# Patient Record
Sex: FEMALE | Race: WHITE | NOT HISPANIC OR LATINO | URBAN - METROPOLITAN AREA
[De-identification: names, ages, dates, MRNs, and addresses within clinical notes are randomized per-mention and may not be internally consistent; named-entity substitution may affect disease eponyms.]

---

## 2017-11-21 ENCOUNTER — OFFICE VISIT - RIVER FALLS (OUTPATIENT)
Dept: FAMILY MEDICINE | Facility: CLINIC | Age: 47
End: 2017-11-21

## 2017-11-23 LAB
CREAT SERPL-MCNC: 0.73 MG/DL (ref 0.5–1.1)
GLUCOSE BLD-MCNC: 108 MG/DL (ref 65–99)
HBA1C MFR BLD: 5.7 %
LDLC SERPL CALC-MCNC: 138 MG/DL

## 2017-11-28 ENCOUNTER — OFFICE VISIT - RIVER FALLS (OUTPATIENT)
Dept: FAMILY MEDICINE | Facility: CLINIC | Age: 47
End: 2017-11-28

## 2017-11-28 ASSESSMENT — MIFFLIN-ST. JEOR: SCORE: 1753.02

## 2022-02-12 VITALS
HEIGHT: 64 IN | TEMPERATURE: 98.6 F | DIASTOLIC BLOOD PRESSURE: 94 MMHG | BODY MASS INDEX: 43.77 KG/M2 | SYSTOLIC BLOOD PRESSURE: 152 MMHG | DIASTOLIC BLOOD PRESSURE: 76 MMHG | WEIGHT: 256.4 LBS | TEMPERATURE: 98.8 F | SYSTOLIC BLOOD PRESSURE: 118 MMHG | HEART RATE: 94 BPM | WEIGHT: 254 LBS | HEART RATE: 108 BPM

## 2022-02-15 NOTE — PROGRESS NOTES
Patient:   JEN VACA            MRN: [id number]            FIN: [id number]               Age:   47 years     Sex:  Female     :  1970   Associated Diagnoses:   Hyperlipidemia; Hypertension; Prediabetes; Overweight   Author:   Anya Kerns MD      Visit Information      Date of Service: 2017 06:00 pm  Performing Location: [facility]  Encounter#: [id number]      Primary Care Provider (PCP):  Stanley Cadena MD, NPI# [id number]      Referring Provider:  Anya Kerns MD    NPI# [id number]      Chief Complaint   2017 6:21 PM CST   Patient is here for lab follow up.        History of Present Illness   pt here to follow up on her labs and reports there has been a lot of variance in her blood pressure when she checks it at home, she is not sure if it is accurate, she is concerned about her labs because her mom  from diabetes related complications, she has had success in weight loss in the past but has not been folowing this lifestyle lateley, she plans to get back into eating healthier and exercising more, her sister is a good support for this also..        Review of Systems             Health Status   Allergies:    Allergic Reactions (Selected)  Severity Not Documented  Adhesive tape (No reactions were documented)  Cipro (No reactions were documented)   Medications:  (Selected)   Prescriptions  Prescribed  Valtrex 1 g oral tablet: 1 tab(s) ( 1 gm ), po, bid, # 60 tab(s), 0 Refill(s), Type: Maintenance, Pharmacy: [facility] #[id number], 1 tab(s) po bid  cholestyramine 4 g/9 g oral powder for reconstitution: See Instructions, Instructions: MIX 1 SCOOP (4 GM) WITH BEVERAGE AND DRINK 3 TIMES DAILY WITH MEALS, # 378 gm, 10 Refill(s), Type: Soft Stop, Pharmacy: [facility] #[id number], MIX 1 SCOOP (4 GM) WITH BEVERAGE AND DRINK 3 TIMES DAILY WITH MEALS  miconazole 2% topical powder: 1 ester, TOP, BID, PRN: for rash, # 90 gm, 1 Refill(s), Type: Maintenance, Pharmacy: [facility] #[id number], 1 ester top bid,PRN:for rash  omeprazole 40 mg oral delayed release capsule: 1 cap(s) ( 40 mg ), PO, Daily, # 90 cap(s), 3 Refill(s), Type: Maintenance, Pharmacy: [facility] #[id number], 1 cap(s) po daily  ondansetron 4 mg oral tablet, disintegratin tab(s) ( 4 mg ), PO, q8 hrs, PRN: for nausea/vomiting, # 20 tab(s), 6 Refill(s), Type: Maintenance, Pharmacy: [facility] #[id number], 1 tab(s) po q8 hrs,PRN:for nausea/vomiting  Documented Medications  Documented  Multiple Vitamins oral tablet: 1 tab(s), po, daily, 0 Refill(s), Type: Maintenance  Probiotics: Probiotics, Instructions: One tab po daily, 0 Refill(s), Type: Maintenance  Vitamin C: 0 Refill(s)  Vitamin D: po, mL, 0 Refill(s)   Problem list:    All Problems  GERD (Gastroesophageal Reflux Disease) / ICD-9-.81 / Confirmed  Hyperlipidemia / SNOMED CT 57044410 / Confirmed  Hypertension / SNOMED CT 6521243993 / Confirmed  Lower Back Pain / ICD-9-.2 / Confirmed  Migraine Headache / ICD-9-.90 / Confirmed  Overweight / ICD-9-.02 / Confirmed  Prediabetes / SNOMED CT 6889122672 / Confirmed  Recurrent Cold Sores / ICD-9-.9 / Confirmed  Seasonal Allergies / ICD-9-.9 / Confirmed  Resolved: Menorrhagia / ICD-9-.2      Histories   Past Medical History:    Active  Migraine Headache (346.90)  Seasonal Allergies (477.9)  Lower Back Pain (724.2)  Recurrent Cold Sores (054.9)  Overweight (278.02)  GERD (Gastroesophageal Reflux Disease) (530.81)  Resolved  Menorrhagia (626.2):  Resolved.   Family History:    Diabetes mellitus  Mother ()  Grandfather (P)  Hypertension  Mother ()  Myocardial infarction  Grandmother (P)  Migraine  Mother ()  MI - Myocardial infarction  Grandmother (M)  Grandfather (P)  Seasonal allergy  Son     Procedure history:    Excisional biopsy of breast mass ([id number]) on 2014 at 44 Years.  Comments:  2014 7:54 AM - Fior Castle.  Esophagogastroduodenoscopy  ([id number]) in 2008 at 38 Years.  Cholecystectomy ([id number]) on 2004 at 34 Years.   section ([id number]).  Comments:  3/8/2013 12:54 PM - Maya Pedroza  x 3  Supracervical hysterectomy ([id number]).  Comments:  3/8/2013 12:55 PM - Maya Pedroza  for menorrhagia   Social History:        Alcohol Assessment: Current            Current      Tobacco Assessment            Never      Substance Abuse Assessment            Never      Employment and Education Assessment            Employed, Work/School description: Day care.      Home and Environment Assessment            Marital status: .  Spouse/Partner name: Second marriage to Trenton Boogie.      Exercise and Physical Activity Assessment            Exercise frequency: 1-2 times/week.  Exercise type: Walking, Treadmill.      Other Assessment            .                     Comments:                      2010 - Shaista Khalil                     Re-         Physical Examination   Vital Signs   2017 6:21 PM CST Temperature Tympanic 98.6 DegF    Peripheral Pulse Rate 108 bpm  HI    Pulse Site Radial artery    HR Method Manual    Systolic Blood Pressure 118 mmHg    Diastolic Blood Pressure 76 mmHg    Mean Arterial Pressure 90 mmHg    BP Site Right arm    BP Method Manual      Measurements from flowsheet : Measurements   2017 6:21 PM CST Height Measured - Standard 64 in    Weight Measured - Standard 256.4 lb    BSA 2.29 m2    Body Mass Index 44.01 kg/m2      General:  Alert and oriented, No acute distress.    Eye:  Pupils are equal, round and reactive to light, Extraocular movements are intact, Normal conjunctiva.    HENT:  Normocephalic, hearing grossly normal during our conversation.    Respiratory:  Respirations are non-labored, Symmetrical chest wall expansion.    Cardiovascular:  Normal peripheral perfusion, brisk capillary refill.    Musculoskeletal:  Normal gait.    Integumentary:  Warm, Dry, No rash.    Neurologic:  Alert,  Oriented, No focal deficits, Cranial Nerves II-XII are grossly intact.    Cognition and Speech:  Speech clear and coherent, Functional cognition intact.    Psychiatric:  Cooperative, Appropriate mood & affect, Normal judgment, Non-suicidal.       Health Maintenance      Recommendations     Pending (in the next year)        OverDue           Alcohol Misuse Screen (Female) due  12/10/14  and every 1  year(s)           Depression Screen (Female) due  12/10/14  and every 1  year(s)           Breast Cancer Screen due  11/20/15  and every 1  year(s)        Due            HIV Screen (if sexually active) (Female) due  11/28/17  and every 1  year(s)           STD Counseling (if sexually active) (Female) due  11/28/17  and every 1  year(s)           Syphilis Screen (if sexually active) (Female) due  11/28/17  and every 1  year(s)        Due In Future            Lipid Disorders Screen (Female) not due until  11/21/18  and every 1  year(s)           Type 2 Diabetes Mellitus Screen (Female) not due until  11/21/18  and every 1  year(s)     Satisfied (in the past 1 year)        Satisfied            Body Mass Index Check (Female) on  11/28/17.           High Blood Pressure Screen (Female) on  11/28/17.           High Blood Pressure Screen (Female) on  11/21/17.           Lipid Disorders Screen (Female) on  11/21/17.           Tobacco Use Screen (Female) on  11/28/17.           Tobacco Use Screen (Female) on  11/21/17.           Type 2 Diabetes Mellitus Screen (Female) on  11/21/17.          Review / Management   Results review:  Lab results   11/21/2017 6:11 PM CST Sodium Level 141 mmol/L    Potassium Level 4.1 mmol/L    Chloride Level 100 mmol/L    CO2 Level 30 mmol/L    Glucose Level 108 mg/dL  HI    BUN 14 mg/dL    Creatinine Level 0.73 mg/dL    BUN/Creat Ratio NOT APPLICABLE    eGFR 97 mL/min/1.73m2    eGFR African American 112 mL/min/1.73m2    Calcium Level 9.8 mg/dL    Hgb A1c 5.7  HI    LDL Direct 138 mg/dL  HI   .        Impression and Plan   Diagnosis     Hyperlipidemia (TUB83-SK E78.5).     Overweight (PSH97-YQ E66.3).     Hypertension (JRU22-DH I10).     Prediabetes (GXP84-PW R73.03).     Patient Instructions:       Counseled: Patient, Regarding diagnosis, Regarding treatment, Diet, Activity, Verbalized understanding.    Patient Instructions:       Counseled: Patient, Regarding diagnosis, Regarding treatment, Verbalized understanding.    Orders     Orders (Selected)   Outpatient Orders  Ordered  RTC (Request): RFV: fasting lipid panel, hemoglobin a1c,, Return in 6 months  RTC (Request): RFV: needs BP check with nurse.     also recommended pt consider seeing dietitian and consider reading the [employer] diet..     Diagnosis     return to clinic if symptoms worsen or do not improve.     return to clinic in 1 year, sooner if needed, and may return to clinc this Fall for annual influenza vaccine.     Plan:  15 minutes spent with patient in direct face to face contact >50% spent counseling.

## 2022-02-15 NOTE — PROCEDURES
Accession Number:       [id number]  STATUS:     FINAL  CONTAINER TYPE::     TP  QUESTION/PROBLEM:     VERIFIED SOURCE  FINAL RESOLUTION:     PROCESS [id number]

## 2022-02-15 NOTE — PROGRESS NOTES
Patient:   JEN VACA            MRN: [id number]            FIN: [id number]               Age:   47 years     Sex:  Female     :  1970   Associated Diagnoses:   Well woman exam; Cervical cancer screening; Elevated blood pressure reading; GERD (Gastroesophageal Reflux Disease); Intertrigo; Migraine Headache; Need for lipid screening; Recurrent Cold Sores; Screening for breast cancer; Screening for diabetes mellitus (DM); Well adult exam   Author:   Anya Kerns MD      Visit Information      Date of Service: 2017 04:52 pm  Performing Location: [facility]  Encounter#: [id number]      Primary Care Provider (PCP):  Stanley Cadena MD, NPI# [id number]      Referring Provider:  Anya Kerns MD    NPI# [id number]      Chief Complaint   2017 4:54 PM CST   annual visit      Well Adult History   Well Adult History             The patient presents for well adult exam.  The patient's general health status is described as good.        Review of Systems   Constitutional:  Negative except as documented in history of present illness.    Eye:  Negative except as documented in history of present illness.    Ear/Nose/Mouth/Throat:  Negative except as documented in history of present illness.    Respiratory:  Negative except as documented in history of present illness.    Cardiovascular:  Negative except as documented in history of present illness.    Gastrointestinal:  Negative except as documented in history of present illness.    Genitourinary:  Negative except as documented in history of present illness.    Gynecologic:  Negative except as documented in history of present illness.    Hematology/Lymphatics:  Negative except as documented in history of present illness.    Endocrine:  Negative except as documented in history of present illness.    Immunologic:  Negative except as documented in history of present illness.    Musculoskeletal:  Negative except as documented in history of  present illness.    Integumentary:  Negative except as documented in history of present illness.    Neurologic:  Negative except as documented in history of present illness.    Psychiatric:  Negative except as documented in history of present illness.              Health Status   Allergies:    Allergic Reactions (Selected)  Severity Not Documented  Adhesive tape (No reactions were documented)  Cipro (No reactions were documented)   Medications:  (Selected)   Prescriptions  Prescribed  Valtrex 1 g oral tablet: 1 tab(s) ( 1 gm ), po, bid, # 60 tab(s), 0 Refill(s), Type: Maintenance, Pharmacy: [facility] #[id number], 1 tab(s) po bid  cholestyramine 4 g/9 g oral powder for reconstitution: See Instructions, Instructions: MIX 1 SCOOP (4 GM) WITH BEVERAGE AND DRINK 3 TIMES DAILY WITH MEALS, # 378 gm, 10 Refill(s), Type: Soft Stop, Pharmacy: [facility] #[id number], MIX 1 SCOOP (4 GM) WITH BEVERAGE AND DRINK 3 TIMES DAILY WITH MEALS  miconazole 2% topical powder: 1 ester, TOP, BID, PRN: for rash, # 90 gm, 1 Refill(s), Type: Maintenance, Pharmacy: [facility] #[id number], 1 ester top bid,PRN:for rash  omeprazole 40 mg oral delayed release capsule: 1 cap(s) ( 40 mg ), PO, Daily, # 90 cap(s), 3 Refill(s), Type: Maintenance, Pharmacy: [facility] #[id number], 1 cap(s) po daily  ondansetron 4 mg oral tablet, disintegratin tab(s) ( 4 mg ), PO, q8 hrs, PRN: for nausea/vomiting, # 20 tab(s), 6 Refill(s), Type: Maintenance, Pharmacy: [facility] #[id number], 1 tab(s) po q8 hrs,PRN:for nausea/vomiting  Documented Medications  Documented  Multiple Vitamins oral tablet: 1 tab(s), po, daily, 0 Refill(s), Type: Maintenance  Probiotics: Probiotics, Instructions: One tab po daily, 0 Refill(s), Type: Maintenance  Vitamin C: 0 Refill(s)  Vitamin D: po, mL, 0 Refill(s)   Problem list:    All Problems  GERD (Gastroesophageal Reflux Disease) / ICD-9-.81 / Confirmed  Lower Back Pain / ICD-9-.2 / Confirmed  Migraine Headache / ICD-9-.90  / Confirmed  Overweight / ICD-9-.02 / Confirmed  Recurrent Cold Sores / ICD-9-.9 / Confirmed  Seasonal Allergies / ICD-9-.9 / Confirmed  Resolved: Menorrhagia / ICD-9-.2      Histories   Past Medical History:    Active  Migraine Headache (346.90)  Seasonal Allergies (477.9)  Lower Back Pain (724.2)  Recurrent Cold Sores (054.9)  Overweight (278.02)  GERD (Gastroesophageal Reflux Disease) (530.81)  Resolved  Menorrhagia (626.2):  Resolved.   Family History:    Diabetes mellitus  Mother ()  Grandfather (P)  Hypertension  Mother ()  Myocardial infarction  Grandmother (P)  Migraine  Mother ()  MI - Myocardial infarction  Grandmother (M)  Grandfather (P)  Seasonal allergy  Son     Procedure history:    Excisional biopsy of breast mass ([id number]) on 2014 at 44 Years.  Comments:  2014 7:54 AM - Fior Castle  Bilateral.  Esophagogastroduodenoscopy ([id number]) in  at 38 Years.  Cholecystectomy ([id number]) on 2004 at 34 Years.   section ([id number]).  Comments:  3/8/2013 12:54 PM - Maya Pedroza  x 3  Supracervical hysterectomy ([id number]).  Comments:  3/8/2013 12:55 PM - Maya Pedroza  for menorrhagia   Social History:        Alcohol Assessment: Current            Current      Tobacco Assessment            Never      Substance Abuse Assessment            Never      Employment and Education Assessment            Employed, Work/School description: Day care.      Home and Environment Assessment            Marital status: .  Spouse/Partner name: Second marriage to Trenton Boogie.      Exercise and Physical Activity Assessment            Exercise frequency: 1-2 times/week.  Exercise type: Walking, Treadmill.      Other Assessment            .                     Comments:                      2010 - Shaista Khalil                     Re-         Physical Examination   Vital Signs   2017 4:54 PM CST Temperature Tympanic 98.8 DegF     Peripheral Pulse Rate 94 bpm    Pulse Site Radial artery    HR Method Manual    Systolic Blood Pressure 152 mmHg  HI    Diastolic Blood Pressure 94 mmHg  HI    Mean Arterial Pressure 113 mmHg    BP Site Right arm    BP Method Manual      Measurements from flowsheet : Measurements   11/21/2017 4:54 PM CST   Weight Measured - Standard                254 lb     General:  Alert and oriented, No acute distress.    Eye:  Pupils are equal, round and reactive to light, Extraocular movements are intact.    HENT:  Normocephalic, Tympanic membranes are clear, Oral mucosa is moist, No pharyngeal erythema, hearing grossly normal.    Neck:  Supple, Non-tender, No jugular venous distention, No lymphadenopathy, No thyromegaly.    Respiratory:  Lungs are clear to auscultation, Respirations are non-labored, Breath sounds are equal, Symmetrical chest wall expansion, No chest wall tenderness.    Cardiovascular:  Normal rate, Regular rhythm, No murmur, No gallop, Normal peripheral perfusion.    Gastrointestinal:  Soft, Non-tender, Non-distended, Normal bowel sounds.    Genitourinary:  No urethral discharge, No lesions, Vulva.         Bladder: Within normal limits.         Perineum: Within normal limits, No edema, No erythema, No hematoma, No lesion.         Labia: Bilateral, Majora, Minora, Within normal limits, No edema, No erythema, No excoriation, No tenderness.         Urethra: No discharge.         Vagina: Mucosa ( Within normal limits ), No bleeding, No discharge, No foreign body, No laceration, No lesions, No mass.         Cervix: Mucosa ( Within normal limits ), no cervical motion tenderness, pap smear obtained.         Uterus: Prolapsed ( SURGICALLY ABSENT ).         Adnexa: Bilateral, Not enlarged, Not tender.    Musculoskeletal:  Normal range of motion, No deformity, Normal gait.    Integumentary:  Warm, Dry, No rash.    Neurologic:  Alert, Oriented, CN 2-12 grossly intact.    Psychiatric:  Cooperative, Appropriate mood &  affect, Normal judgment.       Impression and Plan   Diagnosis     Well woman exam (QIG06-ZV Z01.419).     Cervical cancer screening (TTF51-TZ Z12.4).     Elevated blood pressure reading (MEC90-DG R03.0).     GERD (Gastroesophageal Reflux Disease) (RMM35-MI K21.9).     Intertrigo (UZG79-ZY L30.4).     Migraine Headache (MNO36-DB G43.909).     Need for lipid screening (OVN63-ZH Z13.220).     Recurrent Cold Sores (RXM88-TE B00.1).     Screening for breast cancer (GOL15-LY Z12.31).     Screening for diabetes mellitus (DM) (PGZ28-JK Z13.1).     Well adult exam (YBR79-DW Z00.00).     Course:  Progressing as expected.    Orders     Orders (Selected)   Outpatient Orders  Ordered  Mammogram Routine Screening Bilateral (Request): Screening for breast cancer  RTC (Request): RFV: recheck blood pressure and review labs, Return in 1 week  Ordered (In Transit)  Basic Metabolic Panel* ([facility]): Specimen Type: Serum, Collection Date: 11/21/17 17:31:00 CST  Direct LDL* ([facility]): Specimen Type: Serum, Collection Date: 11/21/17 17:23:00 CST  Hemoglobin A1c* ([facility]): Specimen Type: Blood, Collection Date: 11/21/17 17:23:00 CST  Thinprep + High Risk HPV w/ Reflex Genotypes 16,18* ([facility]): Specimen Type: Pap, Collection Date: 11/21/17 17:19:00 CST  Completed  [id number] periodic preventive med est patient 40-64yrs (Charge): Quantity: 1, Well adult exam  Prescriptions  Prescribed  Valtrex 1 g oral tablet: 1 tab(s) ( 1 gm ), po, bid, # 60 tab(s), 0 Refill(s), Type: Maintenance, Pharmacy: [facility] #[id number], 1 tab(s) po bid  cholestyramine 4 g/9 g oral powder for reconstitution: See Instructions, Instructions: MIX 1 SCOOP (4 GM) WITH BEVERAGE AND DRINK 3 TIMES DAILY WITH MEALS, # 378 gm, 10 Refill(s), Type: Soft Stop, Pharmacy: [facility] #[id number], MIX 1 SCOOP (4 GM) WITH BEVERAGE AND DRINK 3 TIMES DAILY WITH MEALS  miconazole 2% topical powder: 1 ester, TOP, BID, PRN: for rash, # 90 gm, 1 Refill(s), Type: Maintenance, Pharmacy: [facility]  #[id number], 1 ester top bid,PRN:for rash  omeprazole 40 mg oral delayed release capsule: 1 cap(s) ( 40 mg ), PO, Daily, # 90 cap(s), 3 Refill(s), Type: Maintenance, Pharmacy: [facility] #[id number], 1 cap(s) po daily  ondansetron 4 mg oral tablet, disintegratin tab(s) ( 4 mg ), PO, q8 hrs, PRN: for nausea/vomiting, # 20 tab(s), 6 Refill(s), Type: Maintenance, Pharmacy: [facility] #[id number], 1 tab(s) po q8 hrs,PRN:for nausea/vomiting.     Diagnosis     return to clinic if symptoms worsen or do not improve.     return to clinic in 1 year, sooner if needed, and may return to clinc this Fall for annual influenza vaccine.